# Patient Record
Sex: FEMALE | Race: WHITE | URBAN - METROPOLITAN AREA
[De-identification: names, ages, dates, MRNs, and addresses within clinical notes are randomized per-mention and may not be internally consistent; named-entity substitution may affect disease eponyms.]

---

## 2019-06-12 ENCOUNTER — APPOINTMENT (RX ONLY)
Dept: URBAN - METROPOLITAN AREA CLINIC 81 | Facility: CLINIC | Age: 74
Setting detail: DERMATOLOGY
End: 2019-06-12

## 2019-06-12 DIAGNOSIS — L82.0 INFLAMED SEBORRHEIC KERATOSIS: ICD-10-CM

## 2019-06-12 DIAGNOSIS — L72.8 OTHER FOLLICULAR CYSTS OF THE SKIN AND SUBCUTANEOUS TISSUE: ICD-10-CM

## 2019-06-12 PROBLEM — L85.3 XEROSIS CUTIS: Status: ACTIVE | Noted: 2019-06-12

## 2019-06-12 PROBLEM — E03.9 HYPOTHYROIDISM, UNSPECIFIED: Status: ACTIVE | Noted: 2019-06-12

## 2019-06-12 PROBLEM — M06.9 RHEUMATOID ARTHRITIS, UNSPECIFIED: Status: ACTIVE | Noted: 2019-06-12

## 2019-06-12 PROBLEM — I10 ESSENTIAL (PRIMARY) HYPERTENSION: Status: ACTIVE | Noted: 2019-06-12

## 2019-06-12 PROBLEM — E13.9 OTHER SPECIFIED DIABETES MELLITUS WITHOUT COMPLICATIONS: Status: ACTIVE | Noted: 2019-06-12

## 2019-06-12 PROBLEM — J44.9 CHRONIC OBSTRUCTIVE PULMONARY DISEASE, UNSPECIFIED: Status: ACTIVE | Noted: 2019-06-12

## 2019-06-12 PROBLEM — H91.90 UNSPECIFIED HEARING LOSS, UNSPECIFIED EAR: Status: ACTIVE | Noted: 2019-06-12

## 2019-06-12 PROCEDURE — ? DEFER

## 2019-06-12 PROCEDURE — ? COUNSELING

## 2019-06-12 PROCEDURE — 99202 OFFICE O/P NEW SF 15 MIN: CPT | Mod: 25

## 2019-06-12 PROCEDURE — 17110 DESTRUCTION B9 LES UP TO 14: CPT

## 2019-06-12 PROCEDURE — ? LIQUID NITROGEN

## 2019-06-12 ASSESSMENT — LOCATION ZONE DERM
LOCATION ZONE: TRUNK
LOCATION ZONE: FACE

## 2019-06-12 ASSESSMENT — LOCATION DETAILED DESCRIPTION DERM
LOCATION DETAILED: RIGHT LATERAL ZYGOMA
LOCATION DETAILED: RIGHT LATERAL BUTTOCK

## 2019-06-12 ASSESSMENT — LOCATION SIMPLE DESCRIPTION DERM
LOCATION SIMPLE: RIGHT BUTTOCK
LOCATION SIMPLE: RIGHT ZYGOMA

## 2019-06-12 NOTE — HPI: CYST
How Severe Is Your Cyst?: moderate
Is This A New Presentation, Or A Follow-Up?: Cyst
Additional History: Pt given cefdinir and minocycline

## 2019-06-12 NOTE — PROCEDURE: DEFER
Introduction Text (Please End With A Colon): The following procedure was deferred:
Detail Level: Detailed
Other Procedure: exc

## 2019-06-13 ENCOUNTER — APPOINTMENT (RX ONLY)
Dept: URBAN - METROPOLITAN AREA CLINIC 81 | Facility: CLINIC | Age: 74
Setting detail: DERMATOLOGY
End: 2019-06-13

## 2019-06-13 VITALS — DIASTOLIC BLOOD PRESSURE: 89 MMHG | HEART RATE: 78 BPM | SYSTOLIC BLOOD PRESSURE: 176 MMHG

## 2019-06-13 DIAGNOSIS — L72.8 OTHER FOLLICULAR CYSTS OF THE SKIN AND SUBCUTANEOUS TISSUE: ICD-10-CM

## 2019-06-13 PROCEDURE — ? CONSULTATION EXCISION

## 2019-06-13 PROCEDURE — 99212 OFFICE O/P EST SF 10 MIN: CPT | Mod: 24

## 2019-06-13 NOTE — PROCEDURE: CONSULTATION EXCISION
Detail Level: Detailed
Date Scheduled For Excision (Optional): patient to reschedule for next exc date
Size Of Lesion: 1.2
X Size Of Lesion In Cm (Optional): 1